# Patient Record
Sex: FEMALE | NOT HISPANIC OR LATINO | URBAN - METROPOLITAN AREA
[De-identification: names, ages, dates, MRNs, and addresses within clinical notes are randomized per-mention and may not be internally consistent; named-entity substitution may affect disease eponyms.]

---

## 2024-08-08 ENCOUNTER — OFFICE VISIT (OUTPATIENT)
Dept: URBAN - METROPOLITAN AREA CLINIC 25 | Facility: CLINIC | Age: 39
End: 2024-08-08
Payer: COMMERCIAL

## 2024-08-08 ENCOUNTER — DASHBOARD ENCOUNTERS (OUTPATIENT)
Age: 39
End: 2024-08-08

## 2024-08-08 VITALS
HEIGHT: 67 IN | DIASTOLIC BLOOD PRESSURE: 73 MMHG | SYSTOLIC BLOOD PRESSURE: 112 MMHG | BODY MASS INDEX: 21.16 KG/M2 | TEMPERATURE: 98.2 F | WEIGHT: 134.8 LBS | HEART RATE: 80 BPM

## 2024-08-08 DIAGNOSIS — R10.84 GENERALIZED ABDOMINAL PAIN: ICD-10-CM

## 2024-08-08 DIAGNOSIS — K90.49 FOOD INTOLERANCE: ICD-10-CM

## 2024-08-08 DIAGNOSIS — R14.0 BLOATING: ICD-10-CM

## 2024-08-08 PROBLEM — 235719002: Status: ACTIVE | Noted: 2024-08-08

## 2024-08-08 PROCEDURE — 99204 OFFICE O/P NEW MOD 45 MIN: CPT | Performed by: INTERNAL MEDICINE

## 2024-08-08 RX ORDER — ESCITALOPRAM OXALATE 20 MG/1
TABLET ORAL
Qty: 90 TABLET | Status: ACTIVE | COMMUNITY

## 2024-08-08 RX ORDER — ESCITALOPRAM OXALATE 10 MG/1
TABLET ORAL
Qty: 30 TABLET | Status: DISCONTINUED | COMMUNITY

## 2024-08-08 RX ORDER — HYOSCYAMINE SULFATE 0.12 MG/1
1 TABLET UNDER THE TONGUE AND ALLOW TO DISSOLVE AS NEEDED TABLET SUBLINGUAL THREE TIMES A DAY
Qty: 90 TABLET | Refills: 3 | OUTPATIENT
Start: 2024-08-08 | End: 2024-12-05

## 2024-09-19 ENCOUNTER — OFFICE VISIT (OUTPATIENT)
Dept: URBAN - METROPOLITAN AREA CLINIC 25 | Facility: CLINIC | Age: 39
End: 2024-09-19

## 2024-09-19 RX ORDER — HYOSCYAMINE SULFATE 0.12 MG/1
1 TABLET UNDER THE TONGUE AND ALLOW TO DISSOLVE AS NEEDED TABLET SUBLINGUAL THREE TIMES A DAY
Qty: 90 TABLET | Refills: 3 | Status: ACTIVE | COMMUNITY
Start: 2024-08-08 | End: 2024-12-05

## 2024-09-19 RX ORDER — FLUCONAZOLE 150 MG/1
TABLET ORAL
Qty: 1 TABLET | Status: ACTIVE | COMMUNITY

## 2024-09-19 RX ORDER — ESCITALOPRAM OXALATE 20 MG/1
TABLET ORAL
Qty: 90 TABLET | Status: ACTIVE | COMMUNITY